# Patient Record
Sex: MALE | Race: WHITE | NOT HISPANIC OR LATINO
[De-identification: names, ages, dates, MRNs, and addresses within clinical notes are randomized per-mention and may not be internally consistent; named-entity substitution may affect disease eponyms.]

---

## 2018-05-09 ENCOUNTER — RECORDS - HEALTHEAST (OUTPATIENT)
Dept: ADMINISTRATIVE | Facility: OTHER | Age: 63
End: 2018-05-09

## 2018-05-24 ENCOUNTER — RECORDS - HEALTHEAST (OUTPATIENT)
Dept: ADMINISTRATIVE | Facility: OTHER | Age: 63
End: 2018-05-24

## 2018-06-26 ENCOUNTER — RECORDS - HEALTHEAST (OUTPATIENT)
Dept: ADMINISTRATIVE | Facility: OTHER | Age: 63
End: 2018-06-26

## 2018-06-27 ENCOUNTER — RECORDS - HEALTHEAST (OUTPATIENT)
Dept: ADMINISTRATIVE | Facility: OTHER | Age: 63
End: 2018-06-27

## 2018-06-28 ENCOUNTER — RECORDS - HEALTHEAST (OUTPATIENT)
Dept: ADMINISTRATIVE | Facility: OTHER | Age: 63
End: 2018-06-28

## 2018-07-10 ENCOUNTER — RECORDS - HEALTHEAST (OUTPATIENT)
Dept: ADMINISTRATIVE | Facility: OTHER | Age: 63
End: 2018-07-10

## 2018-07-12 ENCOUNTER — RECORDS - HEALTHEAST (OUTPATIENT)
Dept: ADMINISTRATIVE | Facility: OTHER | Age: 63
End: 2018-07-12

## 2018-07-17 ENCOUNTER — RECORDS - HEALTHEAST (OUTPATIENT)
Dept: ADMINISTRATIVE | Facility: OTHER | Age: 63
End: 2018-07-17

## 2018-07-24 ENCOUNTER — RECORDS - HEALTHEAST (OUTPATIENT)
Dept: ADMINISTRATIVE | Facility: OTHER | Age: 63
End: 2018-07-24

## 2018-07-31 ENCOUNTER — RECORDS - HEALTHEAST (OUTPATIENT)
Dept: ADMINISTRATIVE | Facility: OTHER | Age: 63
End: 2018-07-31

## 2018-08-07 ENCOUNTER — RECORDS - HEALTHEAST (OUTPATIENT)
Dept: ADMINISTRATIVE | Facility: OTHER | Age: 63
End: 2018-08-07

## 2018-08-14 ENCOUNTER — RECORDS - HEALTHEAST (OUTPATIENT)
Dept: ADMINISTRATIVE | Facility: OTHER | Age: 63
End: 2018-08-14

## 2018-11-15 ENCOUNTER — OFFICE VISIT - HEALTHEAST (OUTPATIENT)
Dept: FAMILY MEDICINE | Facility: CLINIC | Age: 63
End: 2018-11-15

## 2018-11-15 ENCOUNTER — COMMUNICATION - HEALTHEAST (OUTPATIENT)
Dept: TELEHEALTH | Facility: CLINIC | Age: 63
End: 2018-11-15

## 2018-11-15 DIAGNOSIS — Z00.00 ENCOUNTER FOR MEDICARE ANNUAL WELLNESS EXAM: ICD-10-CM

## 2018-11-15 DIAGNOSIS — C61 PROSTATE CANCER (H): ICD-10-CM

## 2018-11-15 DIAGNOSIS — G47.33 OSA (OBSTRUCTIVE SLEEP APNEA): ICD-10-CM

## 2018-11-15 DIAGNOSIS — E11.8 TYPE 2 DIABETES MELLITUS WITH COMPLICATION, WITHOUT LONG-TERM CURRENT USE OF INSULIN (H): ICD-10-CM

## 2018-11-15 DIAGNOSIS — Z12.5 ENCOUNTER FOR SCREENING FOR MALIGNANT NEOPLASM OF PROSTATE: ICD-10-CM

## 2018-11-15 LAB
ALBUMIN SERPL-MCNC: 4.2 G/DL (ref 3.5–5)
ALBUMIN UR-MCNC: NEGATIVE MG/DL
ALP SERPL-CCNC: 80 U/L (ref 45–120)
ALT SERPL W P-5'-P-CCNC: 80 U/L (ref 0–45)
ANION GAP SERPL CALCULATED.3IONS-SCNC: 11 MMOL/L (ref 5–18)
APPEARANCE UR: CLEAR
AST SERPL W P-5'-P-CCNC: 26 U/L (ref 0–40)
BACTERIA #/AREA URNS HPF: ABNORMAL HPF
BASOPHILS # BLD AUTO: 0.1 THOU/UL (ref 0–0.2)
BASOPHILS NFR BLD AUTO: 1 % (ref 0–2)
BILIRUB SERPL-MCNC: 0.5 MG/DL (ref 0–1)
BILIRUB UR QL STRIP: NEGATIVE
BUN SERPL-MCNC: 20 MG/DL (ref 8–22)
CALCIUM SERPL-MCNC: 9.8 MG/DL (ref 8.5–10.5)
CHLORIDE BLD-SCNC: 106 MMOL/L (ref 98–107)
CHOLEST SERPL-MCNC: 166 MG/DL
CO2 SERPL-SCNC: 27 MMOL/L (ref 22–31)
COLOR UR AUTO: YELLOW
CREAT SERPL-MCNC: 1.22 MG/DL (ref 0.7–1.3)
EOSINOPHIL # BLD AUTO: 0.2 THOU/UL (ref 0–0.4)
EOSINOPHIL NFR BLD AUTO: 3 % (ref 0–6)
ERYTHROCYTE [DISTWIDTH] IN BLOOD BY AUTOMATED COUNT: 13.1 % (ref 11–14.5)
FASTING STATUS PATIENT QL REPORTED: NO
GFR SERPL CREATININE-BSD FRML MDRD: 60 ML/MIN/1.73M2
GLUCOSE BLD-MCNC: 178 MG/DL (ref 70–125)
GLUCOSE UR STRIP-MCNC: NEGATIVE MG/DL
HBA1C MFR BLD: 7.5 % (ref 3.5–6)
HCT VFR BLD AUTO: 42.5 % (ref 40–54)
HDLC SERPL-MCNC: 45 MG/DL
HGB BLD-MCNC: 13.7 G/DL (ref 14–18)
HGB UR QL STRIP: ABNORMAL
KETONES UR STRIP-MCNC: NEGATIVE MG/DL
LDLC SERPL CALC-MCNC: 63 MG/DL
LEUKOCYTE ESTERASE UR QL STRIP: NEGATIVE
LYMPHOCYTES # BLD AUTO: 1.4 THOU/UL (ref 0.8–4.4)
LYMPHOCYTES NFR BLD AUTO: 29 % (ref 20–40)
MCH RBC QN AUTO: 28.3 PG (ref 27–34)
MCHC RBC AUTO-ENTMCNC: 32.2 G/DL (ref 32–36)
MCV RBC AUTO: 88 FL (ref 80–100)
MONOCYTES # BLD AUTO: 0.5 THOU/UL (ref 0–0.9)
MONOCYTES NFR BLD AUTO: 11 % (ref 2–10)
NEUTROPHILS # BLD AUTO: 2.6 THOU/UL (ref 2–7.7)
NEUTROPHILS NFR BLD AUTO: 55 % (ref 50–70)
NITRATE UR QL: NEGATIVE
PH UR STRIP: 6.5 [PH] (ref 5–8)
PLATELET # BLD AUTO: 218 THOU/UL (ref 140–440)
PMV BLD AUTO: 10.1 FL (ref 8.5–12.5)
POTASSIUM BLD-SCNC: 4.1 MMOL/L (ref 3.5–5)
PROT SERPL-MCNC: 7.5 G/DL (ref 6–8)
PSA SERPL-MCNC: <0.1 NG/ML (ref 0–4.5)
RBC # BLD AUTO: 4.84 MILL/UL (ref 4.4–6.2)
RBC #/AREA URNS AUTO: ABNORMAL HPF
SODIUM SERPL-SCNC: 144 MMOL/L (ref 136–145)
SP GR UR STRIP: 1.02 (ref 1–1.03)
SQUAMOUS #/AREA URNS AUTO: ABNORMAL LPF
TRIGL SERPL-MCNC: 292 MG/DL
UROBILINOGEN UR STRIP-ACNC: ABNORMAL
WBC #/AREA URNS AUTO: ABNORMAL HPF
WBC: 4.7 THOU/UL (ref 4–11)

## 2018-11-15 RX ORDER — FINASTERIDE 5 MG/1
5 TABLET, FILM COATED ORAL DAILY
Status: SHIPPED | COMMUNITY
Start: 2018-11-15

## 2018-11-15 RX ORDER — HYDROCODONE BITARTRATE AND ACETAMINOPHEN 5; 325 MG/1; MG/1
1-2 TABLET ORAL EVERY 4 HOURS PRN
Status: SHIPPED | COMMUNITY
Start: 2018-11-15

## 2018-11-15 RX ORDER — SILDENAFIL CITRATE 20 MG/1
20 TABLET ORAL PRN
Status: SHIPPED | COMMUNITY
Start: 2018-11-15

## 2018-11-15 RX ORDER — MECLIZINE HYDROCHLORIDE 25 MG/1
25 TABLET ORAL 3 TIMES DAILY PRN
Status: SHIPPED | COMMUNITY
Start: 2018-11-15

## 2018-11-15 RX ORDER — TAMSULOSIN HYDROCHLORIDE 0.4 MG/1
0.4 CAPSULE ORAL DAILY
Status: SHIPPED | COMMUNITY
Start: 2018-11-14

## 2018-11-15 RX ORDER — METFORMIN HCL 500 MG
1000 TABLET, EXTENDED RELEASE 24 HR ORAL DAILY
Refills: 4 | Status: SHIPPED | COMMUNITY
Start: 2018-10-29

## 2018-11-15 ASSESSMENT — MIFFLIN-ST. JEOR: SCORE: 1641.28

## 2018-11-16 ENCOUNTER — COMMUNICATION - HEALTHEAST (OUTPATIENT)
Dept: FAMILY MEDICINE | Facility: CLINIC | Age: 63
End: 2018-11-16

## 2018-11-16 LAB
ATRIAL RATE - MUSE: 86 BPM
DIASTOLIC BLOOD PRESSURE - MUSE: NORMAL MMHG
INTERPRETATION ECG - MUSE: NORMAL
P AXIS - MUSE: 38 DEGREES
PR INTERVAL - MUSE: 168 MS
QRS DURATION - MUSE: 80 MS
QT - MUSE: 370 MS
QTC - MUSE: 442 MS
R AXIS - MUSE: 16 DEGREES
SYSTOLIC BLOOD PRESSURE - MUSE: NORMAL MMHG
T AXIS - MUSE: 54 DEGREES
VENTRICULAR RATE- MUSE: 86 BPM

## 2018-12-05 ENCOUNTER — COMMUNICATION - HEALTHEAST (OUTPATIENT)
Dept: FAMILY MEDICINE | Facility: CLINIC | Age: 63
End: 2018-12-05

## 2018-12-05 ENCOUNTER — OFFICE VISIT - HEALTHEAST (OUTPATIENT)
Dept: FAMILY MEDICINE | Facility: CLINIC | Age: 63
End: 2018-12-05

## 2018-12-05 DIAGNOSIS — E11.9 TYPE 2 DIABETES MELLITUS WITHOUT COMPLICATION, WITHOUT LONG-TERM CURRENT USE OF INSULIN (H): ICD-10-CM

## 2018-12-05 DIAGNOSIS — I10 BENIGN ESSENTIAL HYPERTENSION: ICD-10-CM

## 2018-12-05 DIAGNOSIS — E10.8 TYPE 1 DIABETES MELLITUS WITH COMPLICATION (H): ICD-10-CM

## 2018-12-05 DIAGNOSIS — I10 HYPERTENSION, UNSPECIFIED TYPE: ICD-10-CM

## 2019-01-09 ENCOUNTER — RECORDS - HEALTHEAST (OUTPATIENT)
Dept: ADMINISTRATIVE | Facility: OTHER | Age: 64
End: 2019-01-09

## 2019-01-14 ENCOUNTER — COMMUNICATION - HEALTHEAST (OUTPATIENT)
Dept: FAMILY MEDICINE | Facility: CLINIC | Age: 64
End: 2019-01-14

## 2019-01-16 ENCOUNTER — RECORDS - HEALTHEAST (OUTPATIENT)
Dept: ADMINISTRATIVE | Facility: OTHER | Age: 64
End: 2019-01-16

## 2019-02-06 ENCOUNTER — OFFICE VISIT - HEALTHEAST (OUTPATIENT)
Dept: FAMILY MEDICINE | Facility: CLINIC | Age: 64
End: 2019-02-06

## 2019-02-06 DIAGNOSIS — I10 ESSENTIAL HYPERTENSION: ICD-10-CM

## 2019-02-06 DIAGNOSIS — E10.649 TYPE 1 DIABETES MELLITUS WITH HYPOGLYCEMIA AND WITHOUT COMA (H): ICD-10-CM

## 2019-02-06 DIAGNOSIS — C61 PROSTATE CANCER (H): ICD-10-CM

## 2019-02-18 ENCOUNTER — COMMUNICATION - HEALTHEAST (OUTPATIENT)
Dept: FAMILY MEDICINE | Facility: CLINIC | Age: 64
End: 2019-02-18

## 2019-02-21 ENCOUNTER — OFFICE VISIT - HEALTHEAST (OUTPATIENT)
Dept: FAMILY MEDICINE | Facility: CLINIC | Age: 64
End: 2019-02-21

## 2019-02-21 ENCOUNTER — COMMUNICATION - HEALTHEAST (OUTPATIENT)
Dept: FAMILY MEDICINE | Facility: CLINIC | Age: 64
End: 2019-02-21

## 2019-02-21 DIAGNOSIS — R53.83 LETHARGY: ICD-10-CM

## 2019-02-21 DIAGNOSIS — I10 ESSENTIAL HYPERTENSION: ICD-10-CM

## 2019-02-21 DIAGNOSIS — R05.9 COUGH: ICD-10-CM

## 2019-02-21 DIAGNOSIS — Z00.00 HEALTHCARE MAINTENANCE: ICD-10-CM

## 2019-02-22 RX ORDER — LOSARTAN POTASSIUM AND HYDROCHLOROTHIAZIDE 12.5; 5 MG/1; MG/1
1 TABLET ORAL DAILY
Qty: 90 TABLET | Refills: 0 | Status: SHIPPED | OUTPATIENT
Start: 2019-02-22

## 2019-02-27 ENCOUNTER — RECORDS - HEALTHEAST (OUTPATIENT)
Dept: ADMINISTRATIVE | Facility: OTHER | Age: 64
End: 2019-02-27

## 2019-03-01 ENCOUNTER — COMMUNICATION - HEALTHEAST (OUTPATIENT)
Dept: FAMILY MEDICINE | Facility: CLINIC | Age: 64
End: 2019-03-01

## 2019-03-14 ENCOUNTER — AMBULATORY - HEALTHEAST (OUTPATIENT)
Dept: FAMILY MEDICINE | Facility: CLINIC | Age: 64
End: 2019-03-14

## 2019-03-14 DIAGNOSIS — I10 BENIGN ESSENTIAL HYPERTENSION: ICD-10-CM

## 2019-04-15 ENCOUNTER — COMMUNICATION - HEALTHEAST (OUTPATIENT)
Dept: FAMILY MEDICINE | Facility: CLINIC | Age: 64
End: 2019-04-15

## 2019-04-15 DIAGNOSIS — E78.00 HYPERCHOLESTEREMIA: ICD-10-CM

## 2019-04-17 ENCOUNTER — COMMUNICATION - HEALTHEAST (OUTPATIENT)
Dept: FAMILY MEDICINE | Facility: CLINIC | Age: 64
End: 2019-04-17

## 2019-04-17 RX ORDER — ATORVASTATIN CALCIUM 40 MG/1
40 TABLET, FILM COATED ORAL DAILY
Qty: 90 TABLET | Refills: 3 | Status: SHIPPED | OUTPATIENT
Start: 2019-04-17

## 2019-04-24 ENCOUNTER — RECORDS - HEALTHEAST (OUTPATIENT)
Dept: ADMINISTRATIVE | Facility: OTHER | Age: 64
End: 2019-04-24

## 2019-04-24 ENCOUNTER — COMMUNICATION - HEALTHEAST (OUTPATIENT)
Dept: TELEHEALTH | Facility: CLINIC | Age: 64
End: 2019-04-24

## 2020-02-17 ENCOUNTER — COMMUNICATION - HEALTHEAST (OUTPATIENT)
Dept: FAMILY MEDICINE | Facility: CLINIC | Age: 65
End: 2020-02-17

## 2020-02-17 DIAGNOSIS — I10 BENIGN ESSENTIAL HYPERTENSION: ICD-10-CM

## 2020-02-19 ENCOUNTER — COMMUNICATION - HEALTHEAST (OUTPATIENT)
Dept: FAMILY MEDICINE | Facility: CLINIC | Age: 65
End: 2020-02-19

## 2021-03-15 ENCOUNTER — COMMUNICATION - HEALTHEAST (OUTPATIENT)
Dept: FAMILY MEDICINE | Facility: CLINIC | Age: 66
End: 2021-03-15

## 2021-03-15 DIAGNOSIS — I10 BENIGN ESSENTIAL HYPERTENSION: ICD-10-CM

## 2021-03-16 ENCOUNTER — COMMUNICATION - HEALTHEAST (OUTPATIENT)
Dept: FAMILY MEDICINE | Facility: CLINIC | Age: 66
End: 2021-03-16

## 2021-03-16 DIAGNOSIS — I10 BENIGN ESSENTIAL HYPERTENSION: ICD-10-CM

## 2021-05-27 NOTE — TELEPHONE ENCOUNTER
RN cannot approve Refill Request    RN can NOT refill this medication historical medication requested.     Last office visit: 2/21/2019 Waylon Rincon MD Last Physical: 11/15/2018 Last MTM visit: Visit date not found Last visit same specialty: 2/21/2019 Waylon Rincon MD.  Next visit within 3 mo: Visit date not found  Next physical within 3 mo: Visit date not found      Corinna Castillo, [facility] Triage/Med Refill 4/17/2019    Requested Prescriptions   Pending Prescriptions Disp Refills     atorvastatin (LIPITOR) 40 MG tablet 90 tablet 3     Sig: Take 1 tablet (40 mg total) by mouth daily.       There is no refill protocol information for this order

## 2021-06-02 VITALS — WEIGHT: 196.7 LBS | BODY MASS INDEX: 29.91 KG/M2

## 2021-06-02 VITALS — BODY MASS INDEX: 29.56 KG/M2 | WEIGHT: 194.4 LBS

## 2021-06-02 VITALS — HEIGHT: 68 IN | BODY MASS INDEX: 29.63 KG/M2 | WEIGHT: 195.5 LBS

## 2021-06-02 VITALS — BODY MASS INDEX: 29.89 KG/M2 | WEIGHT: 196.6 LBS

## 2021-06-06 NOTE — TELEPHONE ENCOUNTER
Over due for follow-up office visit for HTN  Last office visit 2/21/2019 Waylon Rincon MD  1. Essential hypertension  Patient has developed a cough from the lisinopril in my opinion therefore we will discontinue it I will substitute the following ARB with small diuretic and recheck in 3 months this was explained to the patient through an   - losartan-hydrochlorothiazide (HYZAAR) 50-12.5 mg per tablet; Take 1 tablet by mouth daily.  Dispense: 30 tablet; Refill: 3

## 2021-06-15 NOTE — TELEPHONE ENCOUNTER
RN cannot approve Refill Request    RN can NOT refill this medication PCP messaged that patient is overdue for Labs. Last office visit: 2/21/2019 Waylon Rincon MD Last Physical: 11/15/2018 Last MTM visit: Visit date not found Last visit same specialty: 2/21/2019 Waylon Rincon MD.  Next visit within 3 mo: Visit date not found  Next physical within 3 mo: Visit date not found      Linda Valencia, [facility] Triage/Med Refill 3/15/2021    Requested Prescriptions   Pending Prescriptions Disp Refills     hydroCHLOROthiazide (MICROZIDE) 12.5 mg capsule 30 capsule 0     Sig: TAKE 1 CAPSULE(12.5 MG) BY MOUTH DAILY       Diuretics/Combination Diuretics Refill Protocol  Failed - 3/15/2021 12:42 PM        Failed - Visit with PCP or prescribing provider visit in past 12 months     Last office visit with prescriber/PCP: 2/21/2019 Waylon Rincon MD OR same dept: Visit date not found OR same specialty: 2/21/2019 Waylon Rincon MD  Last physical: 11/15/2018 Last MTM visit: Visit date not found   Next visit within 3 mo: Visit date not found  Next physical within 3 mo: Visit date not found  Prescriber OR PCP: Waylon Rincon MD  Last diagnosis associated with med order: 1. Benign essential hypertension  - hydroCHLOROthiazide (MICROZIDE) 12.5 mg capsule; TAKE 1 CAPSULE(12.5 MG) BY MOUTH DAILY  Dispense: 30 capsule; Refill: 0    If protocol passes may refill for 12 months if within 3 months of last provider visit (or a total of 15 months).             Failed - Serum Potassium in past 12 months      No results found for: LN-POTASSIUM          Failed - Serum Sodium in past 12 months      No results found for: LN-SODIUM          Failed - Blood pressure on file in past 12 months     BP Readings from Last 1 Encounters:   02/21/19 138/82             Failed - Serum Creatinine in past 12 months      Creatinine   Date Value Ref Range Status   11/15/2018 1.22 0.70 - 1.30 mg/dL Final

## 2021-06-16 ENCOUNTER — COMMUNICATION - HEALTHEAST (OUTPATIENT)
Dept: FAMILY MEDICINE | Facility: CLINIC | Age: 66
End: 2021-06-16

## 2021-06-16 DIAGNOSIS — I10 BENIGN ESSENTIAL HYPERTENSION: ICD-10-CM

## 2021-06-16 PROBLEM — E10.9 DIABETES MELLITUS TYPE 1 (H): Status: ACTIVE | Noted: 2018-12-05

## 2021-06-16 PROBLEM — E11.9 DIABETES MELLITUS, TYPE 2 (H): Status: ACTIVE | Noted: 2018-12-05

## 2021-06-16 RX ORDER — HYDROCHLOROTHIAZIDE 12.5 MG/1
CAPSULE ORAL
Qty: 90 CAPSULE | Refills: 0 | Status: SHIPPED | OUTPATIENT
Start: 2021-06-16

## 2021-06-16 NOTE — TELEPHONE ENCOUNTER
RN cannot approve Refill Request    RN can NOT refill this medication PCP messaged that patient is overdue for Labs, Office Visit and BP. Last office visit: 2/21/2019 Waylon Rincon MD Last Physical: 11/15/2018 Last MTM visit: Visit date not found Last visit same specialty: 2/21/2019 Waylon Rincon MD.  Next visit within 3 mo: Visit date not found  Next physical within 3 mo: Visit date not found      Ella Hunt, [facility] Triage/Med Refill 3/16/2021    Requested Prescriptions   Pending Prescriptions Disp Refills     hydroCHLOROthiazide (MICROZIDE) 12.5 mg capsule [Pharmacy Med Name: HYDROCHLOROTHIAZIDE 12.5MG CAPSULES] 90 capsule 0     Sig: TAKE 1 CAPSULE(12.5 MG) BY MOUTH DAILY       Diuretics/Combination Diuretics Refill Protocol  Failed - 3/16/2021 11:15 AM        Failed - Visit with PCP or prescribing provider visit in past 12 months     Last office visit with prescriber/PCP: 2/21/2019 Waylon Rincon MD OR same dept: Visit date not found OR same specialty: 2/21/2019 Waylon Rincon MD  Last physical: 11/15/2018 Last MTM visit: Visit date not found   Next visit within 3 mo: Visit date not found  Next physical within 3 mo: Visit date not found  Prescriber OR PCP: Waylon Rincon MD  Last diagnosis associated with med order: 1. Benign essential hypertension  - hydroCHLOROthiazide (MICROZIDE) 12.5 mg capsule [Pharmacy Med Name: HYDROCHLOROTHIAZIDE 12.5MG CAPSULES]; TAKE 1 CAPSULE(12.5 MG) BY MOUTH DAILY  Dispense: 90 capsule; Refill: 0    If protocol passes may refill for 12 months if within 3 months of last provider visit (or a total of 15 months).             Failed - Serum Potassium in past 12 months      No results found for: LN-POTASSIUM          Failed - Serum Sodium in past 12 months      No results found for: LN-SODIUM          Failed - Blood pressure on file in past 12 months     BP Readings from Last 1 Encounters:   02/21/19 138/82             Failed - Serum Creatinine in past 12 months       Creatinine   Date Value Ref Range Status   11/15/2018 1.22 0.70 - 1.30 mg/dL Final

## 2021-06-20 NOTE — LETTER
Letter by Waylon Rincon MD at      Author: Waylon Rincon MD Service: -- Author Type: --    Filed:  Encounter Date: 2/19/2020 Status: (Other)         Domingo Ceballos  [address]  [facility] [address]      February 19, 2020      Dear Domingo Ceballos,    Per our refill protocol, you are due for a medication check office visit. Your prescription for Hyrdochlorothiazide was sent to your pharmacy (02.18.2020). Please call [phone] to schedule an office visit with Erna Alejandre before your next refill is needed to avoid delays.    Thank you,  [facility]

## 2021-06-21 NOTE — PROGRESS NOTES
Assessment and Plan:   This is the first [facility] visit for this 62-year-old male gender patient with hearing impairment who is accompanied by an .  Patient recently had surgery in the summer 2019 for adenocarcinoma of the prostate.  Patient underwent external beam radiation therapy.  Patient has a past medical history of diabetes mellitus and hyperlipidemia and is on metformin and atorvastatin.  We do not have any records for the us to review here today.  He is looking for a provider to take care of his needs.  Patient does use sign language.  Through the  the patient states that he lives in the [facility] and is cohabitating in a consensual  campbell relationship.  Patient is experiencing difficulties with erections.  Patient is having difficulty with his CPAP machine which was prescribed for LANA.  Today we are establishing a physician patient relationship and need to do baseline studies including an A1c and baseline chemistries.  His cardiogram was interpreted as normal.  I explained to the patient through the  that this will take us another visit to evaluate his overall medical status.  I did point out that would be ideal if his consort could accompany him at the next visit so we can treat to get a relationship and the erectile dysfunction appropriately.  The patient is on Proscar and the patient is on tamsulosin for residual prostatism.  Our plan here is to see the patient for follow-up in 3 weeks time after we have some chemistries and after we can establish a long-term relationship.  No acute medical needs at this time    1. Encounter for Medicare annual wellness exam  Workup to include  - Comprehensive Metabolic Panel  - Electrocardiogram Perform - Clinic  - Lipid Cascade  - PSA (Prostatic-Specific Antigen), Annual Screen  - Urinalysis-UC if Indicated  - HM1(CBC and Differential)  - HM1 (CBC with Diff)    2. Encounter for screening for malignant neoplasm  of prostate   We will do PSA; will probably have urology in this area follow the patient down the road  - PSA (Prostatic-Specific Antigen), Annual Screen    3. Prostate cancer (H)  Plan as per above    4. Type 2 diabetes mellitus with complication, without long-term current use of insulin (H)  No change in medications at this time    5. LANA (obstructive sleep apnea)  We will have his CPAP apparatus inspected and the patient is asked to bring the skin    The patient's current medical problems were reviewed.    The following high BMI interventions were performed this visit: encouragement to exercise  The following health maintenance schedule was reviewed with the patient and provided in printed form in the after visit summary:   Health Maintenance   Topic Date Due     TD 18+ HE  11/25/1972     TDAP ADULT ONE TIME DOSE  11/25/1972     ADVANCE DIRECTIVES DISCUSSED WITH PATIENT  11/25/1972     COLONOSCOPY  11/25/2004     ZOSTER VACCINES (1 of 2) 11/25/2004     INFLUENZA VACCINE RULE BASED (1) 11/29/2019 (Originally 8/1/2018)        Subjective:   Chief Complaint: Domingo Ceballos is an 62 y.o. male here for an Annual Wellness visit.   HPI: See under assessment and plan    Review of Systems: 14 point negative please see above.  The rest of the review of systems are negative for all systems.    Patient Care Team:  Waylon Rincon MD as PCP - General (Family Medicine)     There is no problem list on file for this patient.    No past medical history on file.   No past surgical history on file.   No family history on file.   Social History     Socioeconomic History     Marital status:      Spouse name: Not on file     Number of children: Not on file     Years of education: Not on file     Highest education level: Not on file   Social Needs     Financial resource strain: Not on file     Food insecurity - worry: Not on file     Food insecurity - inability: Not on file     Transportation needs - medical: Not on file      "Transportation needs - non-medical: Not on file   Occupational History     Not on file   Tobacco Use     Smoking status: Never Smoker     Smokeless tobacco: Never Used   Substance and Sexual Activity     Alcohol use: Yes     Drug use: No     Sexual activity: Not on file   Other Topics Concern     Not on file   Social History Narrative     Not on file      Current Outpatient Medications   Medication Sig Dispense Refill     atorvastatin (LIPITOR) 40 MG tablet Take 40 mg by mouth daily.       diclofenac sodium (VOLTAREN) 1 % Gel Apply 2 g topically 4 (four) times a day.       finasteride (PROSCAR) 5 mg tablet Take 5 mg by mouth daily.       hydroCHLOROthiazide (MICROZIDE) 12.5 mg capsule Take 12.5 mg by mouth daily.  3     HYDROcodone-acetaminophen 5-325 mg per tablet Take 1-2 tablets by mouth every 4 (four) hours as needed for pain.       meclizine (ANTIVERT) 25 mg tablet Take 25 mg by mouth 3 (three) times a day as needed.       metFORMIN (GLUCOPHAGE-XR) 500 MG 24 hr tablet Take 1,000 mg by mouth daily.  4     sildenafil, antihypertensive, (REVATIO) 20 mg tablet Take 20 mg by mouth as needed.       tamsulosin (FLOMAX) 0.4 mg cap Take 0.4 mg by mouth daily.       No current facility-administered medications for this visit.       Objective:   Vital Signs:   Visit Vitals  /82   Pulse 100   Ht 5' 8\" (1.727 m)   Wt 195 lb 8 oz (88.7 kg)   BMI 29.73 kg/m         VisionScreening:  No exam data present     PHYSICAL EXAM  General Appearance:  Alert, cooperative, no distress; overweight body habitus  Head:  Normocephalic, no obvious abnormality  Ears: TM anatomy normal  Eyes:  PERRL, EOM's intact, conjunctiva and corneas clear  Nose:  Nares symmetrical, septum midline, mucosa pink, no sinus tenderness  Throat:  Lips, tongue, and mucosa are moist, pink, and intact  Neck:  Supple, symmetrical, trachea midline, no adenopathy; thyroid: no enlargement, symmetric,no tenderness/mass/nodules; no carotid bruit, no JVD  Back:  " Symmetrical, no curvature, ROM normal, no CVA tenderness  Chest/Breast:  No mass or tenderness  Lungs:  Clear to auscultation bilaterally, respirations unlabored   Heart:  Normal PMI, regular rate & rhythm, S1 and S2 normal, no murmurs, rubs, or gallops  Abdomen:  Soft, non-tender, bowel sounds active all four quadrants, no mass, or organomegaly  Musculoskeletal:  Tone and strength strong and symmetrical, all extremities  Lymphatic:  No adenopathy  Skin/Hair/Nails:  Skin warm, dry, and intact, no rashes  Neurologic:  Alert and oriented x3, no cranial nerve deficits, normal strength and tone, gait steady  Extremities:  No edema.  Maida's sign negative.    Genitourinary: Patient is circumcised testes were inspected mild testicular atrophy present  Pulses:  Equal bilaterally    Assessment Results 11/15/2018   Activities of Daily Living No help needed   Get Up and Go Score Less than 12 seconds   Mini Cog Total Score 5   Some recent data might be hidden     A Mini-Cog score of 0-2 suggests the possibility of dementia, score of 3-5 suggests no dementia    Identified Health Risks:     He is at risk for lack of exercise and has been provided with information to increase physical activity for the benefit of his well-being.  The patient was provided with written information regarding signs of hearing loss.  Information on urinary incontinence and treatment options given to patient.  He is at risk for falling and has been provided with information to reduce the risk of falling at home.

## 2021-06-22 NOTE — PROGRESS NOTES
Assessment:     1. Type 2 diabetes mellitus without complication, without long-term current use of insulin (H)     2. Hypertension, unspecified type     3. Type 1 diabetes mellitus with complication (H)         Plan:     1. Type 2 diabetes mellitus without complication, without long-term current use of insulin (H)  Patient will continue taking 1000 mg of metformin daily; we will check an A1c again in 3 months    2. Hypertension, unspecified type  Patient's blood pressure still is not ideally controlled on 1 discontinue the Microzide and start him on lisinopril hydrochlorothiazide 10/12.51 daily recheck 2 mo        Subjective:   I am seeing this patient accompanied by his  who is a .  Patient is experiencing some facial flushing and a little lightheadedness blood pressure still is not ideally controlled with systolic of 150 diastolic of 96 on repeat examination.  Patient is undergoing radiation therapy for adenocarcinoma of the prostate.  Complaint today is that of erectile dysfunction.  I am concerned about his blood pressure being elevated will need to discontinue the Microzide and is to therapy with a ACE inhibitor and thiazide diuretic combination specifically lisinopril hydrochlorothiazide 10/12.51 daily.  Patient is doing well otherwise no shortness of breath dyspnea chest pain leg cramps he would like improvement in his sexual function and hopefully with better blood pressure control and better diabetes control this can be obtained otherwise we may have to consider placing the patient on a nitric oxide medication.  Patient understands I will see the patient for follow-up 2 months all medical questions that were asked were answered.25 minute visit including back-and-forth discussion with .    Review of Systems: A complete 14 point review of systems was obtained and is negative or as stated in the history of present illness.    No past medical history on file.  No  family history on file.  No past surgical history on file.  Social History     Tobacco Use     Smoking status: Never Smoker     Smokeless tobacco: Never Used   Substance Use Topics     Alcohol use: Yes     Drug use: No         Objective:   /90   Pulse 80   Wt 194 lb 6.4 oz (88.2 kg)   BMI 29.56 kg/m      General Appearance:  Alert, cooperative, no distress  Head:  Normocephalic, no obvious abnormality  Ears: TM anatomy normal  Eyes:  PERRL, EOM's intact, conjunctiva and corneas clear  Nose:  Nares symmetrical, septum midline, mucosa pink, no sinus tenderness  Throat:  Lips, tongue, and mucosa are moist, pink, and intact  Neck:  Supple, symmetrical, trachea midline, no adenopathy; thyroid: no enlargement, symmetric,no tenderness/mass/nodules; no carotid bruit, no JVD  Back:  Symmetrical, no curvature, ROM normal, no CVA tenderness  Chest/Breast:  No mass or tenderness  Lungs:  Clear to auscultation bilaterally, respirations unlabored   Heart:  Normal PMI, regular rate & rhythm, S1 and S2 normal, no murmurs, rubs, or gallops  Abdomen:  Soft, non-tender, bowel sounds active all four quadrants, no mass, or organomegaly  Musculoskeletal:  Tone and strength strong and symmetrical, all extremities  Lymphatic:  No adenopathy  Skin/Hair/Nails:  Skin warm, dry, and intact, no rashes  Neurologic:  Alert and oriented x3, no cranial nerve deficits, normal strength and tone, gait steady  Extremities:  No edema.  Maida's sign negative.    Genitourinary: deferred  Pulses:  Equal bilaterally     The following high BMI interventions were performed this visit: encouragement to exercise      This note has been dictated using voice recognition software. Any grammatical or context distortions are unintentional and inherent to the the software.

## 2021-06-23 NOTE — PROGRESS NOTES
Assessment:     1. Type 1 diabetes mellitus with hypoglycemia and without coma (H)     2. Essential hypertension     3. Prostate cancer (H)         Plan:     1. Type 1 diabetes mellitus with hypoglycemia and without coma (H)  No change in Metformin dosage; patient will return in 2 months for repeat A1c; blood sugars have been running 140-150    2. Essential hypertension  Blood pressure almost ideal continue lisinopril hydrochlorothiazide at present dosage recheck 2 months    3. Prostate cancer (H)  Dr. Chaidez urology is managing the patient's PSA as well as there is a plan in place to help him with erectile dysfunction with Caverject training      Subjective:   I am seeing the patient accompanied by his .  Patient has questions with regards to his A1c which will be repeated here in 2 months.  His blood pressure is under better control he is feeling better.  We will continue his lisinopril hydrochlorothiazide without change in dosage.  Patient's PSA is being managed by Dr. Chaidez.  He is having difficulty with his erections we had a failure of generic sildenafil so plans are to train the patient with Caverject injections.  I agree with this no contraindications.  Patient does have a dry cough which I attribute to dry humidity where he lives.  His physical examination was unremarkable for any upper respiratory or lower respiratory findings at this time.  All medical questions that were asked were answered chart review was done medication review done I will see the patient again for diabetes management A1c evaluation 2-3 months.25 minute visit    Review of Systems: A complete 14 point review of systems was obtained and is negative or as stated in the history of present illness.    No past medical history on file.  No family history on file.  No past surgical history on file.  Social History     Tobacco Use     Smoking status: Never Smoker     Smokeless tobacco: Never Used   Substance Use Topics      Alcohol use: Yes     Drug use: No         Objective:   /80   Pulse 88   Wt 196 lb 9.6 oz (89.2 kg)   BMI 29.89 kg/m      General Appearance:  Alert, cooperative, no distress  Head:  Normocephalic, no obvious abnormality  Ears: TM anatomy normal patient uses sign language  Eyes:  PERRL, EOM's intact, conjunctiva and corneas clear  Nose:  Nares symmetrical, septum midline, mucosa pink, no sinus tenderness  Throat:  Lips, tongue, and mucosa are moist, pink, and intact  Neck:  Supple, symmetrical, trachea midline, no adenopathy; thyroid: no enlargement, symmetric,no tenderness/mass/nodules; no carotid bruit, no JVD  Back:  Symmetrical, no curvature, ROM normal, no CVA tenderness  Chest/Breast:  No mass or tenderness  Lungs:  Clear to auscultation bilaterally, respirations unlabored   Heart:  Normal PMI, regular rate & rhythm, S1 and S2 normal, no murmurs, rubs, or gallops  Abdomen:  Soft, non-tender, bowel sounds active all four quadrants, no mass, or organomegaly  Musculoskeletal:  Tone and strength strong and symmetrical, all extremities  Lymphatic:  No adenopathy  Skin/Hair/Nails:  Skin warm, dry, and intact, no rashes  Neurologic:  Alert and oriented x3, no cranial nerve deficits, normal strength and tone, gait steady  Extremities:  No edema.  Maida's sign negative.    Genitourinary: deferred  Pulses:  Equal bilaterally     The following high BMI interventions were performed this visit: encouragement to exercise      This note has been dictated using voice recognition software. Any grammatical or context distortions are unintentional and inherent to the the software.

## 2021-06-24 NOTE — TELEPHONE ENCOUNTER
Question following Office Visit  When did you see your provider: 2/6/19 Dr. Rincon  What is your question: Patient is calling with  [phone] from [facility] [employer]. Patient is calling about his persistent dry cough. Patient is asking what else can be done about his cough since it has not gone away. Review office visit note and relayed to patient Dr. Rincon felt it was due to dry humidity. Patient is using a humidifier at home, it is right next to his bed. Patient stated, he does not feel ill, but his cough is all the time. Please reach out to patient and advise.   Okay to leave a detailed message: Yes, video phone can leave a message through , [phone].

## 2021-06-24 NOTE — TELEPHONE ENCOUNTER
Patient should schedule a follow-up appointment to be reevaluated and to decide on additional treatment.

## 2021-06-24 NOTE — TELEPHONE ENCOUNTER
Can you help schedule an appointment for this patient to be re-evaluated? This is a non-urgent visit - thank you.

## 2021-06-24 NOTE — TELEPHONE ENCOUNTER
Left message to call back for: Patient. (via video relay.)  Information to relay to patient: Please help patient schedule an appointment for re-evaluation of his dry cough.

## 2021-06-24 NOTE — PROGRESS NOTES
Assessment:     1. Essential hypertension  losartan-hydrochlorothiazide (HYZAAR) 50-12.5 mg per tablet    DISCONTINUED: losartan (COZAAR) 50 MG tablet   2. Lethargy     3. Cough  losartan-hydrochlorothiazide (HYZAAR) 50-12.5 mg per tablet   4. Healthcare maintenance  Tdap vaccine,  8yo or older,  IM       Plan:     1. Essential hypertension  Patient has developed a cough from the lisinopril in my opinion therefore we will discontinue it I will substitute the following ARB with small diuretic and recheck in 3 months this was explained to the patient through an   - losartan-hydrochlorothiazide (HYZAAR) 50-12.5 mg per tablet; Take 1 tablet by mouth daily.  Dispense: 30 tablet; Refill: 3    2. Lethargy  Probably related to the chronic cough    3. Cough  Begin the following  - losartan-hydrochlorothiazide (HYZAAR) 50-12.5 mg per tablet; Take 1 tablet by mouth daily.  Dispense: 30 tablet; Refill: 3    4. Healthcare maintenance  Updated as follows  - Tdap vaccine,  8yo or older,  IM      Subjective:   Patient comes in today accompanied by an  he is been noticing a cough since he started his blood pressure medication feels well.  He denies any shortness of breath dyspnea chest pain angina no hemoptysis patient is a type II diabetic.  We have reviewed his medications today my opinion is that this is related to his ACE inhibitor we will discontinue this and start him on an ARB small dosage of hydrochlorothiazide and see him for follow-up in 3 months.  All his medical questions were that were asked were answered system review we reviewed and on 14 points were negative was a pleasure to see him again.25 minutes    Review of Systems: A complete 14 point review of systems was obtained and is negative or as stated in the history of present illness.    No past medical history on file.  No family history on file.  No past surgical history on file.  Social History     Tobacco Use     Smoking status: Never Smoker      Smokeless tobacco: Never Used   Substance Use Topics     Alcohol use: Yes     Drug use: No         Objective:   /82   Pulse 75   Temp 97.9  F (36.6  C)   Wt 196 lb 11.2 oz (89.2 kg)   SpO2 98%   BMI 29.91 kg/m      General Appearance:  Alert, cooperative, no distress  Head:  Normocephalic, no obvious abnormality  Ears: TM anatomy normal  Eyes:  PERRL, EOM's intact, conjunctiva and corneas clear  Nose:  Nares symmetrical, septum midline, mucosa pink, no sinus tenderness  Throat:  Lips, tongue, and mucosa are moist, pink, and intact  Neck:  Supple, symmetrical, trachea midline, no adenopathy; thyroid: no enlargement, symmetric,no tenderness/mass/nodules; no carotid bruit, no JVD  Back:  Symmetrical, no curvature, ROM normal, no CVA tenderness  Chest/Breast:  No mass or tenderness  Lungs:  Clear to auscultation bilaterally, respirations unlabored   Heart:  Normal PMI, regular rate & rhythm, S1 and S2 normal, no murmurs, rubs, or gallops  Abdomen:  Soft, non-tender, bowel sounds active all four quadrants, no mass, or organomegaly  Musculoskeletal:  Tone and strength strong and symmetrical, all extremities  Lymphatic:  No adenopathy  Skin/Hair/Nails:  Skin warm, dry, and intact, no rashes  Neurologic:  Alert and oriented x3, no cranial nerve deficits, normal strength and tone, gait steady  Extremities:  No edema.  Maida's sign negative.    Genitourinary: deferred  Pulses:  Equal bilaterally     I have had an Advance Directives discussion with the patient.      This note has been dictated using voice recognition software. Any grammatical or context distortions are unintentional and inherent to the the software.

## 2021-06-24 NOTE — TELEPHONE ENCOUNTER
Per 2/6/2019 OV note:   I am seeing the patient accompanied by his .  Patient has questions with regards to his A1c which will be repeated here in 2 months.  His blood pressure is under better control he is feeling better.  We will continue his lisinopril hydrochlorothiazide without change in dosage.  Patient's PSA is being managed by Dr. Chaidez.  He is having difficulty with his erections we had a failure of generic sildenafil so plans are to train the patient with Caverject injections.  I agree with this no contraindications.  Patient does have a dry cough which I attribute to dry humidity where he lives.  His physical examination was unremarkable for any upper respiratory or lower respiratory findings at this time.  All medical questions that were asked were answered chart review was done medication review done I will see the patient again for diabetes management A1c evaluation 2-3 months.25 minute visit       Patient has been using a humidifier and still has a dry cough.   Please advise

## 2021-06-24 NOTE — TELEPHONE ENCOUNTER
2/21/19  Essential hypertension  losartan-hydrochlorothiazide (HYZAAR) 50-12.5 mg per tablet      DISCONTINUED: losartan (COZAAR) 50 MG tablet     Corinna Castillo RN [facility] Triage/Medication Refill

## 2021-06-25 NOTE — TELEPHONE ENCOUNTER
RN cannot approve Refill Request    RN can NOT refill this medication PCP messaged that patient is overdue for Labs and Office Visit. Last office visit: 2/21/2019 Waylon Rincon MD Last Physical: 11/15/2018 Last MTM visit: Visit date not found Last visit same specialty: 2/21/2019 Waylon Rincon MD.  Next visit within 3 mo: Visit date not found  Next physical within 3 mo: Visit date not found      Ella Hunt, [facility] Triage/Med Refill 6/14/2021    Requested Prescriptions   Pending Prescriptions Disp Refills     hydroCHLOROthiazide (MICROZIDE) 12.5 mg capsule [Pharmacy Med Name: HYDROCHLOROTHIAZIDE 12.5MG CAPSULES] 90 capsule 0     Sig: TAKE 1 CAPSULE(12.5 MG) BY MOUTH DAILY       Diuretics/Combination Diuretics Refill Protocol  Failed - 6/14/2021  9:57 AM        Failed - Visit with PCP or prescribing provider visit in past 12 months     Last office visit with prescriber/PCP: 2/21/2019 Waylon Rincon MD OR same dept: Visit date not found OR same specialty: 2/21/2019 Waylon Rincon MD  Last physical: 11/15/2018 Last MTM visit: Visit date not found   Next visit within 3 mo: Visit date not found  Next physical within 3 mo: Visit date not found  Prescriber OR PCP: Waylon Rincon MD  Last diagnosis associated with med order: 1. Benign essential hypertension  - hydroCHLOROthiazide (MICROZIDE) 12.5 mg capsule [Pharmacy Med Name: HYDROCHLOROTHIAZIDE 12.5MG CAPSULES]; TAKE 1 CAPSULE(12.5 MG) BY MOUTH DAILY  Dispense: 90 capsule; Refill: 0    If protocol passes may refill for 12 months if within 3 months of last provider visit (or a total of 15 months).             Failed - Serum Potassium in past 12 months      No results found for: LN-POTASSIUM          Failed - Serum Sodium in past 12 months      No results found for: LN-SODIUM          Failed - Blood pressure on file in past 12 months     BP Readings from Last 1 Encounters:   02/21/19 138/82             Failed - Serum Creatinine in past 12 months       Creatinine   Date Value Ref Range Status   11/15/2018 1.22 0.70 - 1.30 mg/dL Final

## 2021-06-26 ENCOUNTER — HEALTH MAINTENANCE LETTER (OUTPATIENT)
Age: 66
End: 2021-06-26

## 2021-10-16 ENCOUNTER — HEALTH MAINTENANCE LETTER (OUTPATIENT)
Age: 66
End: 2021-10-16

## 2022-02-05 ENCOUNTER — HEALTH MAINTENANCE LETTER (OUTPATIENT)
Age: 67
End: 2022-02-05

## 2022-07-17 ENCOUNTER — HEALTH MAINTENANCE LETTER (OUTPATIENT)
Age: 67
End: 2022-07-17

## 2022-09-25 ENCOUNTER — HEALTH MAINTENANCE LETTER (OUTPATIENT)
Age: 67
End: 2022-09-25

## 2023-05-13 ENCOUNTER — HEALTH MAINTENANCE LETTER (OUTPATIENT)
Age: 68
End: 2023-05-13

## 2023-08-05 ENCOUNTER — HEALTH MAINTENANCE LETTER (OUTPATIENT)
Age: 68
End: 2023-08-05

## 2023-12-23 ENCOUNTER — HEALTH MAINTENANCE LETTER (OUTPATIENT)
Age: 68
End: 2023-12-23